# Patient Record
Sex: MALE | Employment: FULL TIME | URBAN - METROPOLITAN AREA
[De-identification: names, ages, dates, MRNs, and addresses within clinical notes are randomized per-mention and may not be internally consistent; named-entity substitution may affect disease eponyms.]

---

## 2019-10-16 PROBLEM — R53.83 FATIGUE, UNSPECIFIED TYPE: Status: ACTIVE | Noted: 2017-02-17

## 2019-10-16 PROBLEM — J06.9 VIRAL UPPER RESPIRATORY TRACT INFECTION: Status: ACTIVE | Noted: 2018-03-09

## 2019-10-16 PROBLEM — Z53.20 COLONOSCOPY REFUSED: Status: ACTIVE | Noted: 2017-05-12

## 2019-10-16 PROBLEM — K21.9 GASTROESOPHAGEAL REFLUX DISEASE WITHOUT ESOPHAGITIS: Status: ACTIVE | Noted: 2018-03-09

## 2019-10-16 PROBLEM — E78.5 HYPERLIPIDEMIA, UNSPECIFIED HYPERLIPIDEMIA TYPE: Status: ACTIVE | Noted: 2017-12-08

## 2020-06-16 PROBLEM — J06.9 VIRAL UPPER RESPIRATORY TRACT INFECTION: Status: RESOLVED | Noted: 2018-03-09 | Resolved: 2020-06-16

## 2024-05-29 RX ORDER — ASPIRIN 81 MG/1
81 TABLET ORAL DAILY
COMMUNITY

## 2024-05-29 RX ORDER — OMEPRAZOLE 20 MG/1
20 CAPSULE, DELAYED RELEASE ORAL
COMMUNITY

## 2024-05-29 RX ORDER — LOSARTAN POTASSIUM AND HYDROCHLOROTHIAZIDE 25; 100 MG/1; MG/1
1 TABLET ORAL DAILY
COMMUNITY

## 2024-05-29 RX ORDER — AMOXICILLIN 500 MG
CAPSULE ORAL DAILY
COMMUNITY

## 2024-06-06 ENCOUNTER — ANESTHESIA EVENT (OUTPATIENT)
Dept: ENDOSCOPY | Facility: HOSPITAL | Age: 69
End: 2024-06-06
Payer: COMMERCIAL

## 2024-06-07 ENCOUNTER — HOSPITAL ENCOUNTER (OUTPATIENT)
Facility: HOSPITAL | Age: 69
Setting detail: HOSPITAL OUTPATIENT SURGERY
Discharge: HOME | End: 2024-06-07
Attending: INTERNAL MEDICINE | Admitting: INTERNAL MEDICINE
Payer: COMMERCIAL

## 2024-06-07 ENCOUNTER — ANESTHESIA (OUTPATIENT)
Dept: ENDOSCOPY | Facility: HOSPITAL | Age: 69
End: 2024-06-07
Payer: COMMERCIAL

## 2024-06-07 VITALS
BODY MASS INDEX: 26.66 KG/M2 | DIASTOLIC BLOOD PRESSURE: 53 MMHG | OXYGEN SATURATION: 97 % | HEIGHT: 69 IN | SYSTOLIC BLOOD PRESSURE: 134 MMHG | WEIGHT: 180 LBS | RESPIRATION RATE: 18 BRPM | HEART RATE: 81 BPM | TEMPERATURE: 99 F

## 2024-06-07 LAB — GLUCOSE BLD-MCNC: 102 MG/DL (ref 70–99)

## 2024-06-07 PROCEDURE — 88342 IMHCHEM/IMCYTCHM 1ST ANTB: CPT | Performed by: INTERNAL MEDICINE

## 2024-06-07 PROCEDURE — 3E0H8GC INTRODUCTION OF OTHER THERAPEUTIC SUBSTANCE INTO LOWER GI, VIA NATURAL OR ARTIFICIAL OPENING ENDOSCOPIC: ICD-10-PCS | Performed by: INTERNAL MEDICINE

## 2024-06-07 PROCEDURE — 82962 GLUCOSE BLOOD TEST: CPT

## 2024-06-07 PROCEDURE — 0DB78ZX EXCISION OF STOMACH, PYLORUS, VIA NATURAL OR ARTIFICIAL OPENING ENDOSCOPIC, DIAGNOSTIC: ICD-10-PCS | Performed by: INTERNAL MEDICINE

## 2024-06-07 PROCEDURE — 0DBN8ZX EXCISION OF SIGMOID COLON, VIA NATURAL OR ARTIFICIAL OPENING ENDOSCOPIC, DIAGNOSTIC: ICD-10-PCS | Performed by: INTERNAL MEDICINE

## 2024-06-07 PROCEDURE — 88305 TISSUE EXAM BY PATHOLOGIST: CPT | Performed by: INTERNAL MEDICINE

## 2024-06-07 RX ORDER — LIDOCAINE HYDROCHLORIDE 10 MG/ML
INJECTION, SOLUTION EPIDURAL; INFILTRATION; INTRACAUDAL; PERINEURAL AS NEEDED
Status: DISCONTINUED | OUTPATIENT
Start: 2024-06-07 | End: 2024-06-07 | Stop reason: SURG

## 2024-06-07 RX ORDER — NICOTINE POLACRILEX 4 MG
15 LOZENGE BUCCAL
Status: DISCONTINUED | OUTPATIENT
Start: 2024-06-07 | End: 2024-06-07

## 2024-06-07 RX ORDER — SODIUM CHLORIDE, SODIUM LACTATE, POTASSIUM CHLORIDE, CALCIUM CHLORIDE 600; 310; 30; 20 MG/100ML; MG/100ML; MG/100ML; MG/100ML
INJECTION, SOLUTION INTRAVENOUS CONTINUOUS
Status: DISCONTINUED | OUTPATIENT
Start: 2024-06-07 | End: 2024-06-07

## 2024-06-07 RX ORDER — DEXTROSE MONOHYDRATE 25 G/50ML
50 INJECTION, SOLUTION INTRAVENOUS
Status: DISCONTINUED | OUTPATIENT
Start: 2024-06-07 | End: 2024-06-07

## 2024-06-07 RX ORDER — NICOTINE POLACRILEX 4 MG
30 LOZENGE BUCCAL
Status: DISCONTINUED | OUTPATIENT
Start: 2024-06-07 | End: 2024-06-07

## 2024-06-07 RX ADMIN — SODIUM CHLORIDE, SODIUM LACTATE, POTASSIUM CHLORIDE, CALCIUM CHLORIDE: 600; 310; 30; 20 INJECTION, SOLUTION INTRAVENOUS at 13:46:00

## 2024-06-07 RX ADMIN — LIDOCAINE HYDROCHLORIDE 25 MG: 10 INJECTION, SOLUTION EPIDURAL; INFILTRATION; INTRACAUDAL; PERINEURAL at 13:48:00

## 2024-06-07 NOTE — OPERATIVE REPORT
EGD Operative Report    Blas Griffin Patient Status:  Hospital Outpatient Surgery    1955 MRN [id number]   Formerly [facility] Attending Jesus Pollard,    Hosp Day #   0 PCP Zoraida Leal MD       Pre-op Diagnosis: ANEMIA, UNSPECIFIED TYPE, PERIUMBILICAL ABDOMINAL PAIN     Post-Op Diagnosis:    ESOPHAGUS:  Normal esophagus    STOMACH:  Moderate diffuse gastritis characterized by erythema and edema. No evidence of GI bleeding or ulcerations. No AVM's. Random gastric biopsies taken to rule out Helicobacter pylori infection    DUODENUM:  Normal duodenum     Procedure Performed: EGD     Informed Consent: Informed consent for both the procedure and sedation were obtained from the patient. The potentially life-threatening complications of sedation, bleeding,  Perforation, transfusion or repeat endoscopy were reviewed along with the possible need for hospitalization, surgical management, transfusion or repeat endoscopy should one of these complications arise. The patient understands and is agreeable to proceed.  Sedation Type: MAC-Patient received sedation with monitored anesthesia provided by an anesthesiologist  Moderate Sedation Time: None.  Deep sedation provided by anesthesia.  Procedure Description: The patient was placed in the left lateral decubitus position.  A bite block was placed in the patient’s mouth.  The endoscope was inserted through the mouth and advanced under direct visualization to the 3rd portion of the duodenum and was then withdrawn to examine the duodenal bulb and gastric antrum.  The endoscope was then retroflexed to examine the angulus, GE junction, cardia, body and fundus and then withdrawn to examine the esophagus. The endoscope was then removed from the patient. The patient tolerated the procedure  well with no immediate complications and was transferred to the recovery area in stable condition.  Findings:    ESOPHAGUS:  Normal esophagus    STOMACH:  Moderate diffuse gastritis characterized by erythema and edema. No evidence of GI bleeding or ulcerations. No AVM's. Random gastric biopsies taken to rule out Helicobacter pylori infection    DUODENUM:  Normal duodenum   Recommendations:   Await pathology results   Pantoprazole 40 mg daily for 8 weeks for gastritis   Discharge:  The patient was given an after visit summary detailing the procedure, findings, recommendations and follow up plans.  Jesus Pollard DO  6/7/2024  1:53 PM

## 2024-06-07 NOTE — DISCHARGE INSTRUCTIONS
One large polypoid, ulcerated and villous appearing completely obstructing sigmoid colon mass. This was 30 cm from anal verge. Multiple cold forceps biopsies taken. Juany ink was injected for tattooing   Colonoscope could not be passed beyond the sigmoid cancer as this was completely obstructing     Recommendations:   Await pathology results   Recommend general surgery and oncology evaluation for sigmoid colon cancer   Follows-up with Dr Madrigal in 4-6 weeks from now   Home Care Instructions for Colonoscopy and/or Gastroscopy with Sedation    Diet:  - Resume your regular diet as tolerated unless otherwise instructed.  - Start with light meals to minimize bloating.  - Do not drink alcohol today.    Medication:  - If you have questions about resuming your normal medications, please contact your Primary Care Physician.    Activities:  - Take it easy today. Do not return to work today.  - Do not drive today.  - Do not operate any machinery today (including kitchen equipment).    Colonoscopy:  - You may notice some rectal \"spotting\" (a little blood on the toilet tissue) for a day or two after the exam. This is normal.  - If you experience any rectal bleeding (not spotting), persistent tenderness or sharp severe abdominal pains, oral temperature over 100 degrees Fahrenheit, light-headedness or dizziness, or any other problems, contact your doctor.    Gastroscopy:  - You may have a sore throat for 2-3 days following the exam. This is normal. Gargling with warm salt water (1/2 tsp salt to 1 glass warm water) or using throat lozenges will help.  - If you experience any sharp pain in your neck, abdomen or chest, vomiting of blood, oral temperature over 100 degrees Fahrenheit, light-headedness or dizziness, or any other problems, contact your doctor.    **If unable to reach your doctor, please go to the [facility] Emergency Room**    - Your referring physician will receive a full report of your examination.  - If you do  not hear from your doctor's office within two weeks of your biopsy, please call them for your results.

## 2024-06-07 NOTE — ANESTHESIA POSTPROCEDURE EVALUATION
[facility]    Blas Griffin Patient Status:  Hospital Outpatient Surgery   Age/Gender 68 year old male MRN [id number]   Location [facility] ENDOSCOPY [facility] Attending Jesus Pollard DO   Hosp Day # 0 PCP Zoraida Leal MD       Anesthesia Post-op Note    ESOPHAGOGASTRODUODENOSCOPY (EGD) WITH BIOPSY, COLONOSCOPY WITH BIOPSY AND TATTOING WITH SPOT    Procedure Summary       Date: 06/07/24 Room / Location:  ENDOSCOPY 03 / [facility]    Anesthesia Start: 1346 Anesthesia Stop: 1418    Procedures:       ESOPHAGOGASTRODUODENOSCOPY (EGD) WITH BIOPSY, COLONOSCOPY WITH BIOPSY AND TATTOING WITH SPOT      COLONOSCOPY Diagnosis: (GASTRITIS, OBSTRUCTED COLON MASS)    Surgeons: Jesus Pollard DO Anesthesiologist: Kirby Bryant MD    Anesthesia Type: MAC ASA Status: 2            Anesthesia Type: MAC    Vitals Value Taken Time   /58 06/07/24 1420        Pulse 74 06/07/24 1420   Resp 16 06/07/24 1421   SpO2 90 % 06/07/24 1420   Vitals shown include unfiled device data.    Patient Location: Endoscopy    Anesthesia Type: MAC    Airway Patency: patent    Postop Pain Control: adequate    Mental Status: mildly sedated but able to meaningfully participate in the post-anesthesia evaluation    Nausea/Vomiting: none    Cardiopulmonary/Hydration status: stable euvolemic    Complications: no apparent anesthesia related complications    Postop vital signs: stable    Dental Exam: Unchanged from Preop    Patient to be discharged home when criteria met.

## 2024-06-07 NOTE — OPERATIVE REPORT
Colonoscopy Operative Report    Blas Griffin Patient Status:  Hospital Outpatient Surgery    1955 MRN [id number]   Formerly [facility] Attending Jesus Pollard,    Hosp Day #   0 PCP Zoraida Leal MD     Pre-Operative Diagnosis: ANEMIA, UNSPECIFIED TYPE, PERIUMBILICAL ABDOMINAL PAIN    Post-Operative Diagnosis:  One large polypoid, ulcerated and villous appearing completely obstructing sigmoid colon mass. This was 30 cm from anal verge. Multiple cold forceps biopsies taken. Juany ink was injected for tattooing   Colonoscope could not be passed beyond the sigmoid cancer as this was completely obstructing     Procedure Performed: COLONOSCOPY     Informed Consent: Informed consent for both the procedure and sedation were obtained from the patient. The potentially life-threatening complications of sedation, bleeding,  perforation, transfusion or repeat endoscopy  were reviewed along with the possible need for hospitalization, surgical management, transfusion or repeat endoscopy should one of these complications arise. The patient understands and is agreeable to proceed.  Sedation Type: MAC  A trained sedation nurse was present to assist in monitoring the patient during the entire length of moderate sedation time.    Cecum Withdrawal Time:  7 mins  Date of previous colonoscopy: None     Procedure Description: The patient was placed in the left lateral decubitus position.  After careful digital rectal examination, the Adult colonoscope was inserted into the rectum and advanced to the level of the cecum under direct visualization. The cecum was identified by landmarks, including the appendiceal orifice and ileoceccal valve. Careful examination of the entire colon was performed during withdrawal of the endoscope. The scope was withdrawn to the rectum and retroflexion was performed.  The patient tolerated the procedure well with  no immediate complications. The patient was transferred to the recovery area in stable condition.  Quality of Preparation: Adequate  Aronchick Bowel Prep Scale:  1- excellent   Findings:   One large polypoid, ulcerated and villous appearing completely obstructing sigmoid colon mass. This was 30 cm from anal verge. Multiple cold forceps biopsies taken. Juany ink was injected for tattooing   Colonoscope could not be passed beyond the sigmoid cancer as this was completely obstructing     Recommendations:   Await pathology results   Recommend general surgery and oncology evaluation for sigmoid colon cancer   Follows-up with Dr Madrigal in 4-6 weeks from now   Discharge:  The patient was given an after visit summary detailing the procedure, findings, recommendations and follow up plans.     Jesus Pollard DO  6/7/2024  2:12 PM

## 2024-06-07 NOTE — ANESTHESIA PREPROCEDURE EVALUATION
PRE-OP EVALUATION    Patient Name: Blas Griffin    Admit Diagnosis: ANEMIA, UNSPECIFIED TYPE, PERIUMBILICAL ABDOMINAL PAIN    Pre-op Diagnosis: ANEMIA, UNSPECIFIED TYPE, PERIUMBILICAL ABDOMINAL PAIN    ESOPHAGOGASTRODUODENOSCOPY (EGD), COLONOSCOPY    Anesthesia Procedure: ESOPHAGOGASTRODUODENOSCOPY (EGD), COLONOSCOPY  COLONOSCOPY    Surgeons and Role:     * Jesus Pollard, DO - Primary    Pre-op vitals reviewed.  Temp: 99.3 °F (37.4 °C)  Pulse: 94  Resp: 18  BP: 139/57  SpO2: 100 %  Body mass index is 26.58 kg/m².    Current medications reviewed.  Hospital Medications:   glucose (Dex4) 15 GM/59ML oral liquid 15 g  15 g Oral Q15 Min PRN    Or    glucose (Glutose) 40% oral gel 15 g  15 g Oral Q15 Min PRN    Or    glucose-vitamin C (Dex-4) chewable tab 4 tablet  4 tablet Oral Q15 Min PRN    Or    dextrose 50% injection 50 mL  50 mL Intravenous Q15 Min PRN    Or    glucose (Dex4) 15 GM/59ML oral liquid 30 g  30 g Oral Q15 Min PRN    Or    glucose (Glutose) 40% oral gel 30 g  30 g Oral Q15 Min PRN    Or    glucose-vitamin C (Dex-4) chewable tab 8 tablet  8 tablet Oral Q15 Min PRN    lactated ringers infusion   Intravenous Continuous       Outpatient Medications:     Medications Prior to Admission   Medication Sig Dispense Refill Last Dose    losartan-hydroCHLOROthiazide 100-25 MG Oral Tab Take 1 tablet by mouth daily.   Past Week    Ergocalciferol (VITAMIN D2 OR) Take by mouth. 50,000 international unit weekly   Past Week    Omega-3 Fatty Acids (FISH OIL) 1200 MG Oral Cap Take by mouth daily.   Past Week    aspirin 81 MG Oral Tab EC Take 1 tablet (81 mg total) by mouth daily.   Past Week    omeprazole 20 MG Oral Capsule Delayed Release Take 1 capsule (20 mg total) by mouth every morning before breakfast. Takes 2 capsules every morning   Past Week    SITagliptin Phosphate 50 MG Oral Tab Take 1 tablet (50 mg total) by mouth daily.   6/6/2024    SIMVASTATIN 10 MG Oral Tab TAKE 1 TABLET(10 MG) BY MOUTH EVERY NIGHT 90  tablet 1 Past Week       Allergies: Patient has no known allergies.      Anesthesia Evaluation    Patient summary reviewed.    Anesthetic Complications  (-) history of anesthetic complications         GI/Hepatic/Renal      (+) GERD                           Cardiovascular        Exercise tolerance: good     MET: >4      (+) hypertension   (+) hyperlipidemia                                  Endo/Other      (+) diabetes  type 2,                          Pulmonary    Negative pulmonary ROS.                       Neuro/Psych    Negative neuro/psych ROS.                                  History reviewed. No pertinent surgical history.  Social History     Socioeconomic History    Marital status:    Tobacco Use    Smoking status: Never    Smokeless tobacco: Never   Vaping Use    Vaping status: Never Used   Substance and Sexual Activity    Alcohol use: Yes     Comment: Social/shot of whiskey sometimes before bed    Drug use: Never    Sexual activity: Not Currently     History   Drug Use Unknown     Available pre-op labs reviewed.               Airway      Mallampati: II  Mouth opening: >3 FB  TM distance: > 6 cm  Neck ROM: full Cardiovascular    Cardiovascular exam normal.  Rhythm: regular  Rate: normal     Dental    Dentition appears grossly intact         Pulmonary    Pulmonary exam normal.  Breath sounds clear to auscultation bilaterally.               Other findings              ASA: 2   Plan: MAC  NPO status verified and patient meets guidelines.  Patient has not taken beta blockers in last 24 hours.  Post-procedure pain management plan discussed with surgeon and patient.      Plan/risks discussed with: patient, child/children and sibling                Present on Admission:  **None**

## 2024-06-24 NOTE — PAT NURSING NOTE
Spoke w pt. Stated no change to health history from 6/7/24 EKG/colonoscopy. Told pt he needs EKG pre op.  He requested we call pcp office and ask them to call him to set up appt for EKG. Spoke with Yulisa/Dr Leal/pcp office. She requests order be faxed to office and they will call pt-faxed order. Mailed pre op instructions to pt and also sent via My Chart.

## 2024-06-26 NOTE — H&P (VIEW-ONLY)
Blas Griffin is a 68 year old male.   Patient presents with:  New Patient: Referred by Dr. Pollard, for sigmoid mass,   ________________________________________________________________________  HPI: This 68-year-old male patient is here in referral from Dr. Minor for evaluation of a sigmoid colon mass, clinically consistent with colon cancer.      He has diabetes.  He was eating a lot of celery.  He had left lower quadrant discomfort, no more than 1/10.  Minimal straining with bowel movements.  Variable consistency of the bowel movements.  He underwent blood test, found to have blood loss anemia.  He has not witnessed any blood in his stool or dark stools.  He had an EGD and colonoscopy last Friday (his first scope).    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 Before meal 90 capsule 0   • polyethylene glycol, PEG 3350-KCl-NaBcb-NaCl-NaSulf, 236 g Oral Recon Soln Take as directed. 4000 mL 0   • SITagliptin Phosphate 50 MG Oral Tab Take 1 tablet (50 mg total) by mouth daily. 90 tablet 1   • simvastatin 10 MG Oral Tab Take 1 tablet (10 mg total) by mouth nightly. 90 tablet 1   • ergocalciferol 1.25 MG (14890 UT) Oral Cap Take 50,000 Units by mouth once a week.     • aspirin 81 MG Oral Tab EC Take 81 mg by mouth daily. (Patient not taking: Reported on 6/19/2024)     • losartan-hydroCHLOROthiazide 100-25 MG Oral Tab Take 1 tablet by mouth daily. 90 tablet 0     ________________________________________________________________________  ROS:  GENERAL HEALTH: Fatigue  HEENT: denies nasal congestion, sinus pain or sore throat; hearing loss negative  RESPIRATORY: denies shortness of breath,  wheezing or cough   CARDIOVASCULAR: denies chest pain or TAY; no palpitations   GI: denies nausea, denies vomiting, +/- constipation, +/- diarrhea; no rectal bleeding; no heartburn  GENITAL/: no dysuria, urgency or frequency  HEMATOLOGY: denies hx anemia; denies bruising or excessive bleeding  ________________________________________________________________________  PHYSICAL EXAM:  GENERAL: well developed, well nourished male, in no apparent distress  NECK: supple; no JVD  RESPIRATORY: lungs clear bilaterally  CARDIOVASCULAR: RRR  ABDOMEN: normal active BS+, soft, nondistended; no masses; nontender; mid epigastric hernia which is reducible, small reducible umbilical hernia  GENITAL/: no inguinal hernias  RECTAL: not examined  LYMPHATIC: no lymphadenopathy  MUSCULOSKELETAL: no upper or lower extremity problems      Labs:  Hemoglobin 7.1, hypochromic microcytic.  Alkaline phosphatase 161, other LFTs normal.  PSA 8.33.    CEA: Ordered today, pending.    EGD and colonoscopy:  EGD Operative Report    Blas Griffin Patient Status: Hospital Outpatient Surgery   1955 MRN [id number]  Location [facility] Attending Jesus Pollard DO  Hosp Day #  0 PCP Zoraida Leal MD    Pre-op Diagnosis: ANEMIA, UNSPECIFIED TYPE, PERIUMBILICAL ABDOMINAL PAIN    Post-Op Diagnosis:  ESOPHAGUS: Normal esophagus  STOMACH: Moderate diffuse gastritis characterized by erythema and edema. No evidence of GI bleeding or ulcerations. No AVM's. Random gastric biopsies taken to rule out Helicobacter pylori infection  DUODENUM: Normal duodenum    Procedure Performed: EGD    Informed Consent: Informed consent for both the procedure and sedation were obtained from the patient. The potentially life-threatening complications of sedation, bleeding, Perforation, transfusion or repeat endoscopy were reviewed along with the possible need for hospitalization, surgical management, transfusion or repeat endoscopy  should one of these complications arise. The patient understands and is agreeable to proceed.  Sedation Type: MAC-Patient received sedation with monitored anesthesia provided by an anesthesiologist  Moderate Sedation Time: None. Deep sedation provided by anesthesia.  Procedure Description: The patient was placed in the left lateral decubitus position. A bite block was placed in the patient’s mouth. The endoscope was inserted through the mouth and advanced under direct visualization to the 3rd portion of the duodenum and was then withdrawn to examine the duodenal bulb and gastric antrum. The endoscope was then retroflexed to examine the angulus, GE junction, cardia, body and fundus and then withdrawn to examine the esophagus. The endoscope was then removed from the patient. The patient tolerated the procedure well with no immediate complications and was transferred to the recovery area in stable condition.  Findings:  ESOPHAGUS: Normal esophagus  STOMACH: Moderate diffuse gastritis characterized by erythema and edema. No evidence of GI bleeding or ulcerations. No AVM's. Random gastric biopsies taken to rule out Helicobacter pylori infection  DUODENUM: Normal duodenum  Recommendations:  Await pathology results  Pantoprazole 40 mg daily for 8 weeks for gastritis  Discharge: The patient was given an after visit summary detailing the procedure, findings, recommendations and follow up plans.  Jesus Pollard DO  2024  1:53 PM      Electronically signed by Jesus Pollard DO at 2024 1:55 PM CDT   Back to top of OR Notes  Operative Report - Jesus Pollard DO - 2024 1:50 PM CDT  Formatting of this note is different from the original.  Colonoscopy Operative Report    Blas Griffin Patient Status: Hospital Outpatient Surgery   1955 MRN [id number]  [facility] Attending Jesus Pollard DO  Hosp Day #  0 PCP Zoraida Leal MD    Pre-Operative Diagnosis:  ANEMIA, UNSPECIFIED TYPE, PERIUMBILICAL ABDOMINAL PAIN    Post-Operative Diagnosis:  One large polypoid, ulcerated and villous appearing completely obstructing sigmoid colon mass. This was 30 cm from anal verge. Multiple cold forceps biopsies taken. Juany ink was injected for tattooing  Colonoscope could not be passed beyond the sigmoid cancer as this was completely obstructing    Procedure Performed: COLONOSCOPY    Informed Consent: Informed consent for both the procedure and sedation were obtained from the patient. The potentially life-threatening complications of sedation, bleeding, perforation, transfusion or repeat endoscopy were reviewed along with the possible need for hospitalization, surgical management, transfusion or repeat endoscopy should one of these complications arise. The patient understands and is agreeable to proceed.  Sedation Type: MAC  A trained sedation nurse was present to assist in monitoring the patient during the entire length of moderate sedation time.    Cecum Withdrawal Time: 7 mins  Date of previous colonoscopy: None    Procedure Description: The patient was placed in the left lateral decubitus position. After careful digital rectal examination, the Adult colonoscope was inserted into the rectum and advanced to the level of the cecum under direct visualization. The cecum was identified by landmarks, including the appendiceal orifice and ileoceccal valve. Careful examination of the entire colon was performed during withdrawal of the endoscope. The scope was withdrawn to the rectum and retroflexion was performed. The patient tolerated the procedure well with no immediate complications. The patient was transferred to the recovery area in stable condition.  Quality of Preparation: Adequate  Aronchick Bowel Prep Scale:  1- excellent  Findings:  One large polypoid, ulcerated and villous appearing completely obstructing sigmoid colon mass. This was 30 cm from anal verge. Multiple cold forceps  biopsies taken. Juany ink was injected for tattooing  Colonoscope could not be passed beyond the sigmoid cancer as this was completely obstructing    Recommendations:  Await pathology results  Recommend general surgery and oncology evaluation for sigmoid colon cancer  Follows-up with Dr Madrigal in 4-6 weeks from now  Discharge: The patient was given an after visit summary detailing the procedure, findings, recommendations and follow up plans.    Jesus Pollard DO  6/7/2024  2:12 PM    Pathology:  Final Diagnosis:    A. Gastric biopsy:  -Gastric antral mucosa with reactive gastropathy.  -Gastric fundic mucosa with no significant pathologic change.  -No Helicobacter pylori organisms seen.      B. Sigmoid colon mass biopsy:  -At least tubulovillous adenoma with high grade dysplasia.  See comment.      Electronically signed by Blaze Contreras MD on 6/10/2024 at  2:52 PM   Embedded Images    Final Diagnosis Comment    Histologic sections of the sigmoid colon mass biopsy demonstrate fragments of tubulovillous adenoma with at least high grade dysplasia.  Multiple levels are reviewed and a  definitive diagnosis of invasive carcinoma cannot be established on the submitted material.  However, since this is a superficial biopsy of a larger mass lesion, a more severe, unsampled process cannot be excluded.  Clinical and endoscopic correlation is suggested.  Dr. KHOA Pal has reviewed the case and concurs.       CT scan:  DATE OF SERVICE: 05.25.2024  PROCEDURE:  CT ABDOMEN+PELVIS(CONTRAST ONLY)(CPT=74177)    CLINICAL INDICATION:  Anemia. Periumbilical pain.    COMPARISON: None.    TECHNIQUE:  CT of the abdomen and pelvis was obtained with intravenous contrast.  80 mL of Isovue  370 was administered intravenously.    Automated exposure control and ALARA manual techniques for patient specific dose reduction were  followed while maintaining the necessary diagnostic image quality.    ADVERSE REACTION: None.    FINDINGS:    LOWER THORAX:  The heart is normal in size. No focal consolidation is seen.  LIVER: There are innumerable (on the order of 30-50) bilobar hypodense ill-defined liver lesions.  The largest on the right measures 3.2 cm within hepatic segment VIII (series 4, image 30). The  largest on the left measures 3.4 cm in hepatic segment Soraida (series 4, image 27).  BILIARY TREE: The gallbladder is present. There is no biliary ductal dilatation.  PANCREAS: The pancreas is normal in size.  SPLEEN: The spleen is normal in size.  ADRENALS: The adrenal glands are normal in size and shape.  KIDNEYS: There is no hydronephrosis.  LYMPH NODES: There is no adenopathy.  VASCULATURE: Mild atherosclerotic calcification of a normal caliber abdominal aorta is present.  PERITONEUM/MESENTRY/OMENTUM: No intra-abdominal free air or free fluid is seen.  GI TRACT: There is no bowel obstruction. There is moderate to marked thickening involving 5.7 cm of  sigmoid colon with mild pericolonic stranding (series 4, image 116). Adjacent to the sigmoid colon  are a few soft tissue nodules measuring up to 1.4 cm (series 4, image 108). The appendix is  unremarkable.  PELVIC UROGENITAL STRUCTURES: The bladder is unremarkable. The prostate is present.  BODY WALL: A 1.5 cm lucent lesion is noted within the left iliac wing (series 4, image 131). There  is moderate sacroiliac osteoarthritis. Moderate degenerative changes are seen throughout the lumbar  spine. A 2.2 x 1.8 cm fat-containing umbilical hernia is present.  A 2.2 x 3.4 cm supraumbilical  fat-containing hernia is noted (series 4, image 85).    Impression   IMPRESSION:    1.  Segmental thickening of the sigmoid colon with pericolonic soft tissue nodules is worrisome for  a primary colonic neoplasm. Further investigation with colonoscopy would be advised if not  previously performed.  2.  Innumerable hypodense hepatic lesions, suspicious for metastatic disease.  3.  A 1.5 cm lucent lesion in the left iliac wing may  represent focal fatty marrow; however,  attention on follow-up imaging would be advised.    This examination was marked for follow-up.       CT images reviewed.  The liver lesions were discussed with them in detail.    ________________________________________________________________________  ASSESSMENT/ PLAN:  Sigmoid colon mass clinically a nearly obstructing sigmoid cancer, liver lesions concerning for metastatic disease, blood loss anemia    Recommendation:  He met with Dr. Mandujano today.  She and I discussed this case prior to our visit.    He has what appears to be stage IV colon cancer, with the extent of metastatic disease rendering it incurable.  Although the sigmoid biopsies were not conclusively confirmatory, it is consistent with colon cancer.  I discussed the details of this in detail with him and his family.  All of the other indicators suggest colon cancer, including the obstructing mass seen on colonoscopy, the CT findings, and the liver lesions.  We discussed the innumerable liver lesions, and the blood supply of the GI tract.  We discussed that if this represents stage IV disease, he is not curable.  An interventional radiology liver biopsy is planned.  Communicated with IR.  If this confirms metastatic colon cancer, no additional biopsy would need to be obtained from the sigmoid mass.  He has a pending CEA as well.  CT of the chest is ordered.    The role of palliative resection was discussed with Dr. Mandujano and with them.  He does have blood loss anemia, though he never saw any yaneli blood.  This is likely a slow oozing of blood, and may be manageable with iron replacement.  We discussed his left lower quadrant low-level symptoms, which are intermittent.  There is no sign of obstruction on the CT scan, and he seems to be having regular bowel movements.  On the other hand, the colonoscopy demonstrated sufficient obstruction that the scope could not traverse the tumor.  The lack of visualization of the  proximal colon is moot, given the overall situation.  We discussed that surgical resection would not add to long-term survival.  Furthermore, surgical recovery would likely delay him receiving other crucial palliative treatments.  In the balance, we are opting for the liver biopsy and proceeding with chemotherapy.  The role of palliative resection will be deferred, and hopefully not required.  We also discussed the role of palliative stent, which would likely be a bridge to palliative resection, if needed.    We discussed the role of genetic testing.  I discussed this with Dr. Mandujano, and awaiting for test from the current pathology or needle biopsy.    Together, we reviewed my [place] literature on colon cancer and colon surgery.  He brought pictures from the colonoscopy with him, which were helpful to review.    We plan to obtain liver biopsy, follow-up on CEA, complete the staging workup.  He will be placed on iron to replace his blood losses, including iron infusion.  He will likely require port insertion, and we discussed this.    Thank you for involving me in the care of your patient.  ________________________________________________________________________

## 2024-06-28 ENCOUNTER — ANESTHESIA EVENT (OUTPATIENT)
Dept: SURGERY | Facility: HOSPITAL | Age: 69
End: 2024-06-28
Payer: COMMERCIAL

## 2024-07-02 ENCOUNTER — APPOINTMENT (OUTPATIENT)
Dept: GENERAL RADIOLOGY | Facility: HOSPITAL | Age: 69
End: 2024-07-02
Attending: SURGERY
Payer: COMMERCIAL

## 2024-07-02 ENCOUNTER — HOSPITAL ENCOUNTER (OUTPATIENT)
Facility: HOSPITAL | Age: 69
Setting detail: HOSPITAL OUTPATIENT SURGERY
Discharge: HOME | End: 2024-07-02
Attending: SURGERY | Admitting: SURGERY
Payer: COMMERCIAL

## 2024-07-02 ENCOUNTER — ANESTHESIA (OUTPATIENT)
Dept: SURGERY | Facility: HOSPITAL | Age: 69
End: 2024-07-02
Payer: COMMERCIAL

## 2024-07-02 VITALS
BODY MASS INDEX: 24.94 KG/M2 | TEMPERATURE: 99 F | RESPIRATION RATE: 16 BRPM | HEART RATE: 90 BPM | SYSTOLIC BLOOD PRESSURE: 117 MMHG | DIASTOLIC BLOOD PRESSURE: 50 MMHG | OXYGEN SATURATION: 99 % | HEIGHT: 69 IN | WEIGHT: 168.38 LBS

## 2024-07-02 DIAGNOSIS — C18.7 MALIGNANT NEOPLASM OF SIGMOID COLON (HCC): Primary | ICD-10-CM

## 2024-07-02 LAB — GLUCOSE BLD-MCNC: 124 MG/DL (ref 70–99)

## 2024-07-02 PROCEDURE — 77001 FLUOROGUIDE FOR VEIN DEVICE: CPT | Performed by: SURGERY

## 2024-07-02 PROCEDURE — 02HV33Z INSERTION OF INFUSION DEVICE INTO SUPERIOR VENA CAVA, PERCUTANEOUS APPROACH: ICD-10-PCS | Performed by: SURGERY

## 2024-07-02 PROCEDURE — 82962 GLUCOSE BLOOD TEST: CPT

## 2024-07-02 PROCEDURE — 0JH60WZ INSERTION OF TOTALLY IMPLANTABLE VASCULAR ACCESS DEVICE INTO CHEST SUBCUTANEOUS TISSUE AND FASCIA, OPEN APPROACH: ICD-10-PCS | Performed by: SURGERY

## 2024-07-02 PROCEDURE — 76000 FLUOROSCOPY <1 HR PHYS/QHP: CPT | Performed by: SURGERY

## 2024-07-02 DEVICE — POWERPORT CLEARVUE SLIM WITH SMOOTH SEPTUM, 8F POLYURETHANE CATHETER, SUTURE PLUGS, INTERMEDIATE KIT
Type: IMPLANTABLE DEVICE | Site: CHEST | Status: FUNCTIONAL
Brand: POWERPORT CLEARVUE

## 2024-07-02 RX ORDER — NICOTINE POLACRILEX 4 MG
30 LOZENGE BUCCAL
Status: DISCONTINUED | OUTPATIENT
Start: 2024-07-02 | End: 2024-07-02 | Stop reason: HOSPADM

## 2024-07-02 RX ORDER — LABETALOL HYDROCHLORIDE 5 MG/ML
5 INJECTION, SOLUTION INTRAVENOUS EVERY 5 MIN PRN
Status: DISCONTINUED | OUTPATIENT
Start: 2024-07-02 | End: 2024-07-02

## 2024-07-02 RX ORDER — ONDANSETRON 2 MG/ML
4 INJECTION INTRAMUSCULAR; INTRAVENOUS EVERY 6 HOURS PRN
Status: DISCONTINUED | OUTPATIENT
Start: 2024-07-02 | End: 2024-07-02

## 2024-07-02 RX ORDER — NALOXONE HYDROCHLORIDE 0.4 MG/ML
80 INJECTION, SOLUTION INTRAMUSCULAR; INTRAVENOUS; SUBCUTANEOUS AS NEEDED
Status: DISCONTINUED | OUTPATIENT
Start: 2024-07-02 | End: 2024-07-02

## 2024-07-02 RX ORDER — SODIUM CHLORIDE, SODIUM LACTATE, POTASSIUM CHLORIDE, CALCIUM CHLORIDE 600; 310; 30; 20 MG/100ML; MG/100ML; MG/100ML; MG/100ML
INJECTION, SOLUTION INTRAVENOUS CONTINUOUS
Status: DISCONTINUED | OUTPATIENT
Start: 2024-07-02 | End: 2024-07-02

## 2024-07-02 RX ORDER — BUPIVACAINE HYDROCHLORIDE 2.5 MG/ML
INJECTION, SOLUTION EPIDURAL; INFILTRATION; INTRACAUDAL AS NEEDED
Status: DISCONTINUED | OUTPATIENT
Start: 2024-07-02 | End: 2024-07-02

## 2024-07-02 RX ORDER — MEPERIDINE HYDROCHLORIDE 25 MG/ML
12.5 INJECTION INTRAMUSCULAR; INTRAVENOUS; SUBCUTANEOUS AS NEEDED
Status: DISCONTINUED | OUTPATIENT
Start: 2024-07-02 | End: 2024-07-02

## 2024-07-02 RX ORDER — DEXTROSE MONOHYDRATE 25 G/50ML
50 INJECTION, SOLUTION INTRAVENOUS
Status: DISCONTINUED | OUTPATIENT
Start: 2024-07-02 | End: 2024-07-02 | Stop reason: HOSPADM

## 2024-07-02 RX ORDER — HYDROCODONE BITARTRATE AND ACETAMINOPHEN 10; 325 MG/1; MG/1
1 TABLET ORAL ONCE AS NEEDED
Status: DISCONTINUED | OUTPATIENT
Start: 2024-07-02 | End: 2024-07-02

## 2024-07-02 RX ORDER — ACETAMINOPHEN 500 MG
1000 TABLET ORAL ONCE
Status: DISCONTINUED | OUTPATIENT
Start: 2024-07-02 | End: 2024-07-02 | Stop reason: HOSPADM

## 2024-07-02 RX ORDER — TRAMADOL HYDROCHLORIDE 50 MG/1
TABLET ORAL EVERY 6 HOURS PRN
Qty: 10 TABLET | Refills: 0 | Status: SHIPPED | OUTPATIENT
Start: 2024-07-02

## 2024-07-02 RX ORDER — HYDROCODONE BITARTRATE AND ACETAMINOPHEN 10; 325 MG/1; MG/1
2 TABLET ORAL ONCE AS NEEDED
Status: DISCONTINUED | OUTPATIENT
Start: 2024-07-02 | End: 2024-07-02

## 2024-07-02 RX ORDER — MIDAZOLAM HYDROCHLORIDE 1 MG/ML
INJECTION INTRAMUSCULAR; INTRAVENOUS AS NEEDED
Status: DISCONTINUED | OUTPATIENT
Start: 2024-07-02 | End: 2024-07-02 | Stop reason: SURG

## 2024-07-02 RX ORDER — ONDANSETRON 2 MG/ML
INJECTION INTRAMUSCULAR; INTRAVENOUS AS NEEDED
Status: DISCONTINUED | OUTPATIENT
Start: 2024-07-02 | End: 2024-07-02 | Stop reason: SURG

## 2024-07-02 RX ORDER — HYDROMORPHONE HYDROCHLORIDE 1 MG/ML
0.2 INJECTION, SOLUTION INTRAMUSCULAR; INTRAVENOUS; SUBCUTANEOUS EVERY 5 MIN PRN
Status: DISCONTINUED | OUTPATIENT
Start: 2024-07-02 | End: 2024-07-02

## 2024-07-02 RX ORDER — DEXAMETHASONE SODIUM PHOSPHATE 4 MG/ML
VIAL (ML) INJECTION AS NEEDED
Status: DISCONTINUED | OUTPATIENT
Start: 2024-07-02 | End: 2024-07-02 | Stop reason: SURG

## 2024-07-02 RX ORDER — MIDAZOLAM HYDROCHLORIDE 1 MG/ML
1 INJECTION INTRAMUSCULAR; INTRAVENOUS EVERY 5 MIN PRN
Status: DISCONTINUED | OUTPATIENT
Start: 2024-07-02 | End: 2024-07-02

## 2024-07-02 RX ORDER — NICOTINE POLACRILEX 4 MG
15 LOZENGE BUCCAL
Status: DISCONTINUED | OUTPATIENT
Start: 2024-07-02 | End: 2024-07-02 | Stop reason: HOSPADM

## 2024-07-02 RX ORDER — HYDROMORPHONE HYDROCHLORIDE 1 MG/ML
0.6 INJECTION, SOLUTION INTRAMUSCULAR; INTRAVENOUS; SUBCUTANEOUS EVERY 5 MIN PRN
Status: DISCONTINUED | OUTPATIENT
Start: 2024-07-02 | End: 2024-07-02

## 2024-07-02 RX ORDER — METOCLOPRAMIDE HYDROCHLORIDE 5 MG/ML
10 INJECTION INTRAMUSCULAR; INTRAVENOUS EVERY 8 HOURS PRN
Status: DISCONTINUED | OUTPATIENT
Start: 2024-07-02 | End: 2024-07-02

## 2024-07-02 RX ORDER — ACETAMINOPHEN 500 MG
1000 TABLET ORAL ONCE AS NEEDED
Status: DISCONTINUED | OUTPATIENT
Start: 2024-07-02 | End: 2024-07-02

## 2024-07-02 RX ORDER — HYDROMORPHONE HYDROCHLORIDE 1 MG/ML
0.4 INJECTION, SOLUTION INTRAMUSCULAR; INTRAVENOUS; SUBCUTANEOUS EVERY 5 MIN PRN
Status: DISCONTINUED | OUTPATIENT
Start: 2024-07-02 | End: 2024-07-02

## 2024-07-02 RX ADMIN — MIDAZOLAM HYDROCHLORIDE 2 MG: 1 INJECTION INTRAMUSCULAR; INTRAVENOUS at 11:20:00

## 2024-07-02 RX ADMIN — MIDAZOLAM HYDROCHLORIDE 2 MG: 1 INJECTION INTRAMUSCULAR; INTRAVENOUS at 11:15:00

## 2024-07-02 RX ADMIN — ONDANSETRON 4 MG: 2 INJECTION INTRAMUSCULAR; INTRAVENOUS at 11:16:00

## 2024-07-02 RX ADMIN — DEXAMETHASONE SODIUM PHOSPHATE 4 MG: 4 MG/ML VIAL (ML) INJECTION at 11:16:00

## 2024-07-02 RX ADMIN — SODIUM CHLORIDE, SODIUM LACTATE, POTASSIUM CHLORIDE, CALCIUM CHLORIDE: 600; 310; 30; 20 INJECTION, SOLUTION INTRAVENOUS at 12:13:00

## 2024-07-02 NOTE — OPERATIVE REPORT
[facility] OPERATIVE REPORT     Patient Name:  Blas Griffin  MRN: [id number]    Date of Operation:  2024  : 1955       PREOPERATIVE DIAGNOSIS: Metastatic sigmoid colon cancer (stage IV)     POSTOPERATIVE DIAGNOSIS: Same     PROCEDURE PERFORMED: Insertion of Bard PowerPort with fluoroscopy (tunneled).      SURGEON: William Blandon MD      ASSISTANT: GARO Lowe (services required for positioning, prepping, draping, setting up the field, exposing, retracting, suturing, closing, cutting, dressing, etc.)     ANESTHESIA: MAC.      COMPLICATIONS: None.      ESTIMATED BLOOD LOSS: 2 mL.      SPECIMEN: None.      IMPLANT: Bard ClearVue Slim Implantable PowerPort.      DRAINS: None.      BLOOD PRODUCTS ADMINISTERED: None.      COMPLICATIONS: None.      HISTORY: This patient is a 69-year-old male who was recently diagnosed with a sigmoid colon cancer metastatic to the liver.  He was seen in surgical and medical oncological consultations.  Chemotherapy is recommended.  Port insertion is requested to facilitate administration.  The risks, benefits, and alternatives of port insertion were discussed with him and his family, including bleeding, infection, deep venous thrombosis, pneumothorax, malposition, malfunction, etc. The left side was selected and marked.      PROCEDURE: The patient was seen in the preoperative holding area with his brother and daughter. The surgical plan was reviewed. The left side was initialed.  He was taken to the operating room, placed in the supine position, and sedated. The upper extremities were tucked at the sides. The chest and neck area were prepped and draped in the standard fashion. A timeout was performed. Local anesthetic was infiltrated. An incision was made over the left deltopectoral groove and carried to the cephalic vein. Proximal and distal control was obtained. A venotomy was made, and the Bard ClearVue PowerPort catheter was introduced into the vein and  advanced under fluoroscopic guidance to the cavoatrial junction. In this position, it freely aspirated blood and was heparinized. The catheter was cut to size and secured to the port with the attachment device. The port was anchored in a subcutaneous pocket with Prolene stitches. The wound was irrigated and rendered hemostatic. It was closed in 2 layers with Vicryl. The port was accessed through the skin with a Muñoz needle. It freely aspirated blood and was heparinized. The port was de-accessed. A Dermabond dressing was applied. The needle, sponge, and instrument counts were reported as correct. The patient was awakened and transferred to recovery.  His family was notified of the findings and his status.         William Giang MD

## 2024-07-02 NOTE — ANESTHESIA POSTPROCEDURE EVALUATION
Problem: Discharge Planning  Goal: Discharge to home or other facility with appropriate resources  1/18/2024 1710 by Gem Joyner RN  Outcome: Progressing  1/18/2024 0436 by Nannette Cordero RN  Outcome: Progressing     Problem: Pain  Goal: Verbalizes/displays adequate comfort level or baseline comfort level  1/18/2024 1710 by Gem Joyner RN  Outcome: Progressing  1/18/2024 0436 by Nannette Cordero RN  Outcome: Progressing     Problem: Gastrointestinal - Adult  Goal: Minimal or absence of nausea and vomiting  1/18/2024 1710 by Gem Joyner RN  Outcome: Progressing  1/18/2024 0436 by Nannette Cordero RN  Outcome: Progressing  Goal: Maintains or returns to baseline bowel function  1/18/2024 0436 by Nannette Cordero RN  Outcome: Progressing  Goal: Maintains adequate nutritional intake  1/18/2024 1710 by Gem Joyner RN  Outcome: Progressing  1/18/2024 0436 by Nannette Cordero RN  Outcome: Progressing      [facility]    Blas Griffin Patient Status:  Hospital Outpatient Surgery   Age/Gender 69 year old male MRN [id number]   Location [facility] SURGERY Attending William Weaver MD   Hosp Day # 0 PCP Zoraida Leal MD       Anesthesia Post-op Note    PORT-A-CATHETER PLACEMENT WITH FLUOROSCOPY    Procedure Summary       Date: 07/02/24 Room / Location:  MAIN OR 05 /  MAIN OR    Anesthesia Start: 1114 Anesthesia Stop: 1213    Procedure: PORT-A-CATHETER PLACEMENT WITH FLUOROSCOPY Diagnosis: (CANCER OF SIGMOID COLON)    Surgeons: William Weaver MD Anesthesiologist: Dariusz Samano MD    Anesthesia Type: MAC ASA Status: 3            Anesthesia Type: MAC    Vitals Value Taken Time   BP 95/55 07/02/24 1213   Temp 98.5 °F (36.9 °C) 07/02/24 1213   Pulse 93 07/02/24 1213   Resp 16 07/02/24 1213   SpO2 93 % 07/02/24 1213       Patient Location: Same Day Surgery    Anesthesia Type: MAC    Airway Patency: patent    Postop Pain Control: adequate    Mental Status: preanesthetic baseline    Nausea/Vomiting: none    Cardiopulmonary/Hydration status: stable euvolemic    Complications: no apparent anesthesia related complications    Postop vital signs: stable    Dental Exam: Unchanged from Preop    Patient to be discharged home when criteria met.

## 2024-07-02 NOTE — INTERVAL H&P NOTE
Pre-op Diagnosis: CANCER OF SIGMOID COLON    The above referenced H&P was reviewed by William Weaver MD on 7/2/2024, the patient was examined and no significant changes have occurred in the patient's condition since the H&P was performed.  I discussed with the patient and/or legal representative the potential benefits, risks and side effects of this procedure; the likelihood of the patient achieving goals; and potential problems that might occur during recuperation.  I discussed reasonable alternatives to the procedure, including risks, benefits and side effects related to the alternatives and risks related to not receiving this procedure.  We will proceed with procedure as planned.    He had an image guided liver biopsy, confirming metastatic colon cancer.  He followed up with Dr. Mandujano from medical oncology.  They are planning to initiate chemotherapy.  Port insertion is requested.  The left side was marked.  The surgical procedure, risks, benefits, alternatives, recovery, restrictions, etc. were discussed.

## 2024-07-02 NOTE — DISCHARGE INSTRUCTIONS
No need for postoperative chest x-ray.  Okay to shower on 7/3  The skin glue until it flakes off  Ice to the incision  Over-the-counter pain medications are okay  Tramadol for breakthrough pain  No heavy lifting or upper body exercise for 1 week  Follow-up with me only as needed, as he will have close follow-up with the infusion center

## 2024-07-02 NOTE — ANESTHESIA PREPROCEDURE EVALUATION
PRE-OP EVALUATION    Patient Name: Blas Griffin    Admit Diagnosis: CANCER OF SIGMOID COLON    Pre-op Diagnosis: CANCER OF SIGMOID COLON    PORT-A-CATHETER PLACEMENT WITH FLUOROSCOPY    Anesthesia Procedure: PORT-A-CATHETER PLACEMENT WITH FLUOROSCOPY    Surgeons and Role:     * William Weaver MD - Primary    Pre-op vitals reviewed.        Body mass index is 26.58 kg/m².    Current medications reviewed.  Hospital Medications:  No current facility-administered medications on file as of .       Outpatient Medications:     No medications prior to admission.       Allergies: Patient has no known allergies.      Anesthesia Evaluation    Patient summary reviewed.    Anesthetic Complications  (-) history of anesthetic complications         GI/Hepatic/Renal      (+) GERD                           Cardiovascular                  (+) hypertension   (+) hyperlipidemia                                  Endo/Other      (+) diabetes         (+) anemia                   Pulmonary                           Neuro/Psych                              Colonoscopy refused Dysthymic disorder  Essential (primary) hypertension Fatigue, unspecified type  Gastroesophageal reflux disease without esophagitis Hyperlipidemia, unspecified hyperlipidemia type  Other specified counseling Over weight  Pure hypercholesterolemia             Past Surgical History:   Procedure Laterality Date    Colonoscopy N/A 6/7/2024    Procedure: COLONOSCOPY;  Surgeon: Jesus Pollard DO;  Location:  ENDOSCOPY     Social History     Socioeconomic History    Marital status:    Tobacco Use    Smoking status: Never    Smokeless tobacco: Never   Vaping Use    Vaping status: Never Used   Substance and Sexual Activity    Alcohol use: Yes     Comment: Social/shot of whiskey sometimes before bed    Drug use: Never    Sexual activity: Not Currently     History   Drug Use Unknown     Available pre-op labs reviewed.               Airway      Mallampati: I  Mouth  opening: >3 FB  TM distance: > 6 cm  Neck ROM: full Cardiovascular    Cardiovascular exam normal.         Dental    Dentition appears grossly intact         Pulmonary    Pulmonary exam normal.                 Other findings              ASA: 3   Plan: MAC  NPO status verified and           Plan/risks discussed with: patient                Present on Admission:  **None**

## 2025-06-30 RX ORDER — FERROUS SULFATE 324(65)MG
1 TABLET, DELAYED RELEASE (ENTERIC COATED) ORAL DAILY
COMMUNITY

## 2025-07-08 ENCOUNTER — HOSPITAL ENCOUNTER (OUTPATIENT)
Facility: HOSPITAL | Age: 70
Setting detail: HOSPITAL OUTPATIENT SURGERY
Discharge: HOME | End: 2025-07-08
Attending: INTERNAL MEDICINE | Admitting: INTERNAL MEDICINE
Payer: COMMERCIAL

## 2025-07-08 ENCOUNTER — ANESTHESIA EVENT (OUTPATIENT)
Dept: ENDOSCOPY | Facility: HOSPITAL | Age: 70
End: 2025-07-08
Payer: COMMERCIAL

## 2025-07-08 ENCOUNTER — ANESTHESIA (OUTPATIENT)
Dept: ENDOSCOPY | Facility: HOSPITAL | Age: 70
End: 2025-07-08
Payer: COMMERCIAL

## 2025-07-08 VITALS
OXYGEN SATURATION: 98 % | BODY MASS INDEX: 23.62 KG/M2 | WEIGHT: 165 LBS | DIASTOLIC BLOOD PRESSURE: 69 MMHG | TEMPERATURE: 98 F | HEIGHT: 70 IN | HEART RATE: 72 BPM | SYSTOLIC BLOOD PRESSURE: 125 MMHG | RESPIRATION RATE: 16 BRPM

## 2025-07-08 PROCEDURE — 88305 TISSUE EXAM BY PATHOLOGIST: CPT | Performed by: INTERNAL MEDICINE

## 2025-07-08 RX ORDER — HYDROMORPHONE HYDROCHLORIDE 1 MG/ML
0.4 INJECTION, SOLUTION INTRAMUSCULAR; INTRAVENOUS; SUBCUTANEOUS EVERY 5 MIN PRN
Status: DISCONTINUED | OUTPATIENT
Start: 2025-07-08 | End: 2025-07-08

## 2025-07-08 RX ORDER — LABETALOL HYDROCHLORIDE 5 MG/ML
5 INJECTION, SOLUTION INTRAVENOUS EVERY 5 MIN PRN
Status: DISCONTINUED | OUTPATIENT
Start: 2025-07-08 | End: 2025-07-08

## 2025-07-08 RX ORDER — NICOTINE POLACRILEX 4 MG
30 LOZENGE BUCCAL
Status: DISCONTINUED | OUTPATIENT
Start: 2025-07-08 | End: 2025-07-08

## 2025-07-08 RX ORDER — HYDROCODONE BITARTRATE AND ACETAMINOPHEN 5; 325 MG/1; MG/1
1 TABLET ORAL ONCE AS NEEDED
Status: DISCONTINUED | OUTPATIENT
Start: 2025-07-08 | End: 2025-07-08

## 2025-07-08 RX ORDER — NICOTINE POLACRILEX 4 MG
15 LOZENGE BUCCAL
Status: DISCONTINUED | OUTPATIENT
Start: 2025-07-08 | End: 2025-07-08

## 2025-07-08 RX ORDER — HYDROCODONE BITARTRATE AND ACETAMINOPHEN 5; 325 MG/1; MG/1
2 TABLET ORAL ONCE AS NEEDED
Status: DISCONTINUED | OUTPATIENT
Start: 2025-07-08 | End: 2025-07-08

## 2025-07-08 RX ORDER — NALOXONE HYDROCHLORIDE 0.4 MG/ML
0.08 INJECTION, SOLUTION INTRAMUSCULAR; INTRAVENOUS; SUBCUTANEOUS AS NEEDED
Status: DISCONTINUED | OUTPATIENT
Start: 2025-07-08 | End: 2025-07-08

## 2025-07-08 RX ORDER — SODIUM CHLORIDE, SODIUM LACTATE, POTASSIUM CHLORIDE, CALCIUM CHLORIDE 600; 310; 30; 20 MG/100ML; MG/100ML; MG/100ML; MG/100ML
INJECTION, SOLUTION INTRAVENOUS CONTINUOUS
Status: DISCONTINUED | OUTPATIENT
Start: 2025-07-08 | End: 2025-07-08

## 2025-07-08 RX ORDER — HYDROMORPHONE HYDROCHLORIDE 1 MG/ML
0.2 INJECTION, SOLUTION INTRAMUSCULAR; INTRAVENOUS; SUBCUTANEOUS EVERY 5 MIN PRN
Status: DISCONTINUED | OUTPATIENT
Start: 2025-07-08 | End: 2025-07-08

## 2025-07-08 RX ORDER — DEXTROSE MONOHYDRATE 25 G/50ML
50 INJECTION, SOLUTION INTRAVENOUS
Status: DISCONTINUED | OUTPATIENT
Start: 2025-07-08 | End: 2025-07-08

## 2025-07-08 RX ORDER — HYDROMORPHONE HYDROCHLORIDE 1 MG/ML
0.6 INJECTION, SOLUTION INTRAMUSCULAR; INTRAVENOUS; SUBCUTANEOUS EVERY 5 MIN PRN
Status: DISCONTINUED | OUTPATIENT
Start: 2025-07-08 | End: 2025-07-08

## 2025-07-08 RX ORDER — ACETAMINOPHEN 500 MG
1000 TABLET ORAL ONCE AS NEEDED
Status: DISCONTINUED | OUTPATIENT
Start: 2025-07-08 | End: 2025-07-08

## 2025-07-08 RX ADMIN — SODIUM CHLORIDE, SODIUM LACTATE, POTASSIUM CHLORIDE, CALCIUM CHLORIDE: 600; 310; 30; 20 INJECTION, SOLUTION INTRAVENOUS at 09:18:00

## 2025-07-08 NOTE — DISCHARGE INSTRUCTIONS
Once again we witnessed sigmoid colon mass (found of colonoscopy in June 2024) which has significantly shrunken and decreased in size but the mass is still completely obstructing and we could not traversed with colonoscope beyond this sigmoid colon  mass.  Biopsied. This mass is  present at 30 cm from anal verge   Evidence of previously injected tejas ink in sigmoid colon     Recommendations:   Follow-up with oncology and surgery         Home Care Instructions for Colonoscopy with Sedation    Diet:  - Resume your regular diet as tolerated unless otherwise instructed.  - Start with light meals to minimize bloating.  - Do not drink alcohol today.    Medication:  - If you have questions about resuming your normal medications, please contact your Primary Care Physician.    Activities:  - Take it easy today. Do not return to work today.  - Do not drive today.  - Do not operate any machinery today (including kitchen equipment).    Colonoscopy:  - You may notice some rectal \"spotting\" (a little blood on the toilet tissue) for a day or two after the exam. This is normal.  - If you experience any rectal bleeding (not spotting), persistent tenderness or sharp severe abdominal pains, oral temperature over 100 degrees Fahrenheit, light-headedness or dizziness, or any other problems, contact your doctor.    **If unable to reach your doctor, please go to the [facility] Emergency Room**    - Your referring physician will receive a full report of your examination.  - If you do not hear from your doctor's office within two weeks of your biopsy, please call them for your results.    You may be able to see your laboratory results in [employer] between 4 and 7 business days.  In some cases, your physician may not have viewed the results before they are released to [employer].  If you have questions regarding your results contact the physician who ordered the test/exam by phone or via [employer] by choosing \"Ask a Medical Question.\"

## 2025-07-08 NOTE — ANESTHESIA PREPROCEDURE EVALUATION
PRE-OP EVALUATION    Patient Name: Blas Griffin    Admit Diagnosis: MALIGNANT NEOPLASM OF COLON, UNSPECIFIED PART OF COLON    Pre-op Diagnosis: MALIGNANT NEOPLASM OF COLON, UNSPECIFIED PART OF COLON    COLONOSCOPY    Anesthesia Procedure: COLONOSCOPY    Surgeons and Role:     * Jesus Pollard, DO - Primary    Pre-op vitals reviewed.  Temp: 98.4 °F (36.9 °C)  Pulse: 70  Resp: 17  BP: 135/63  SpO2: 99 %  Body mass index is 23.68 kg/m².    Current medications reviewed.  Hospital Medications:  Current Medications[1]    Outpatient Medications:   Prescriptions Prior to Admission[2]    Allergies: Patient has no known allergies.      Anesthesia Evaluation    Patient summary reviewed.    Anesthetic Complications  (-) history of anesthetic complications         GI/Hepatic/Renal                                 Cardiovascular        Exercise tolerance: good     MET: >4      (+) hypertension                                     Endo/Other           (-) hypothyroidism  (-) hyperthyroidism                     Pulmonary    Negative pulmonary ROS.                       Neuro/Psych                                      Past Surgical History[3]  Social Hx on file[4]  History   Drug Use Unknown     Available pre-op labs reviewed.               Airway      Mallampati: III  Mouth opening: >3 FB  TM distance: > 6 cm  Neck ROM: full Cardiovascular      Rhythm: regular       Dental             Pulmonary    Pulmonary exam normal.                 Other findings              ASA: 3   Plan: general  NPO status verified and           Plan/risks discussed with: patient          Monitored anesthesia care explained, pt is aware that they may remember proceedings from the procedure and that this is normal. Discussed potential for conversion to general anesthesia if unable to tolerate procedure in MAC   Plan/risks discussed with: patient         Present on Admission:  **None**             [1]    lactated ringers infusion   Intravenous Continuous    [2]   Medications Prior to Admission   Medication Sig Dispense Refill Last Dose/Taking    Ferrous Sulfate 324 (65 Fe) MG Oral Tab EC Take 1 tablet by mouth daily.   7/7/2025    losartan-hydroCHLOROthiazide 100-25 MG Oral Tab Take 1 tablet by mouth in the morning.   7/8/2025 Morning    Ergocalciferol (VITAMIN D2 OR) Take by mouth. 50,000 international unit weekly   Past Week    Omega-3 Fatty Acids (FISH OIL) 1200 MG Oral Cap Take by mouth in the morning.   7/7/2025    omeprazole 20 MG Oral Capsule Delayed Release Take 1 capsule (20 mg total) by mouth every morning before breakfast. Takes 2 capsules every morning   7/7/2025    SIMVASTATIN 10 MG Oral Tab TAKE 1 TABLET(10 MG) BY MOUTH EVERY NIGHT 90 tablet 1 7/7/2025    traMADol 50 MG Oral Tab Take 1-2 tablets ( mg total) by mouth every 6 (six) hours as needed for Pain. (Patient not taking: Reported on 6/30/2025) 10 tablet 0 Not Taking    SITagliptin Phosphate 50 MG Oral Tab Take 0.5 tablets (25 mg total) by mouth in the morning.   More than a month   [3]   Past Surgical History:  Procedure Laterality Date    Colonoscopy N/A 6/7/2024    Procedure: COLONOSCOPY;  Surgeon: Jesus Pollard DO;  Location:  ENDOSCOPY   [4]   Social History  Socioeconomic History    Marital status:    Tobacco Use    Smoking status: Never    Smokeless tobacco: Never   Vaping Use    Vaping status: Never Used   Substance and Sexual Activity    Alcohol use: Yes     Comment: Social/shot of whiskey sometimes before bed    Drug use: Never    Sexual activity: Not Currently

## 2025-07-08 NOTE — H&P
[facility]   part of [facility]    Blas Griffin Patient Status:  Hospital Outpatient Surgery    1955 MRN [id number]   Location [facility] ENDOSCOPY PAIN [facility]ENTER Attending Jesus Pollard DO   Hosp Day # 0 PCP Zoraida Leal MD     Active Problems:    * No active hospital problems. *      Blas Griffin is a 70 year old male.    Allergies: Allergies[1]    Past Medical History[2]    Blood pressure 135/63, pulse 70, temperature 98.4 °F (36.9 °C), temperature source Temporal, resp. rate 17, height 5' 10\" (1.778 m), weight 165 lb (74.8 kg), SpO2 99%.    GENERAL HEALTH: feels well otherwise  SKIN: denies any unusual skin lesions or rashes  RESPIRATORY: denies shortness of breath with exertion  CARDIOVASCULAR: denies chest pain on exertion  GI:Refer to HPI   : no dysuria, hematuria  MUSCULOSKELETAL: no joint pain, swelling, or warmth  NEURO: denies headaches     Objective:     Ht 6' (1.829 m)   Wt 216 lb 6.4 oz (98.2 kg)   BMI 29.35 kg/m²    GENERAL: well developed, well nourished, in no apparent distress  SKIN: no rashes, no jaundice  HEENT: atraumatic, normocephalic  EYES: no icterus  NECK: supple  LUNGS: normal respiratory motion without distress  CARDIO: RRR without murmur  GI: normal active BS, no tenderness on palpation, no masses or ascites, normal liver span/no organomegaly  MUSCULOSKELETAL: no joint deformity, swelling or erythema  NEURO: Alert   EXTREMITIES: no edema        Colonoscopy        Jesus Pollard DO  2025       [1] No Known Allergies  [2]   Past Medical History:   Cancer (HCC)    Colon    Diabetes (HCC)    Esophageal reflux    High blood pressure    High cholesterol    Personal history of antineoplastic chemotherapy    LAST CHEMO 2025    Viral upper respiratory tract infection    465.9 2018 Viral upper respiratory tract infection Active [id number]    Visual impairment    glasses

## 2025-07-08 NOTE — OPERATIVE REPORT
Colonoscopy Operative Report    Blas Griffin Patient Status:  Hospital Outpatient Surgery    1955 MRN [id number]   [facility] ENDOSCOPY PAIN CENTER Attending Jesus Pollard,    Hosp Day #   0 PCP Zoraida Leal MD     Pre-Operative Diagnosis: MALIGNANT NEOPLASM OF COLON, UNSPECIFIED PART OF COLON    Post-Operative Diagnosis:    Once again we witnessed sigmoid colon mass (found of colonoscopy in 2024) which has significantly shrunken and decreased in size but the mass is still completely obstructing and we could not traversed with colonoscope. This was present at 30 cm from anal verge   Evidence of previously injected tejas ink in sigmoid colon       Procedure Performed: COLONOSCOPY     Informed Consent: Informed consent for both the procedure and sedation were obtained from the patient. The potentially life-threatening complications of sedation, bleeding,  perforation, transfusion or repeat endoscopy  were reviewed along with the possible need for hospitalization, surgical management, transfusion or repeat endoscopy should one of these complications arise. The patient understands and is agreeable to proceed.  Sedation Type: MAC-Patient received sedation with monitored anesthesia provided by an anesthesiologist  Moderate Sedation Time:   A trained sedation nurse was present to assist in monitoring the patient during the entire length of moderate sedation time.    Cecum Withdrawal Time:  NA  Date of previous colonoscopy: 2024     Procedure Description: The patient was placed in the left lateral decubitus position.  After careful digital rectal examination, the Adult colonoscope was inserted into the rectum and advanced to the level of the sigmoid colon. . The scope was withdrawn to the rectum and retroflexion was performed.  The patient tolerated the procedure well with no immediate complications. The patient was transferred  to the recovery area in stable condition.  Quality of Preparation: Adequate  Aronchick Bowel Prep Scale:  1- excellent   Findings:   Once again we witnessed sigmoid colon mass (found of colonoscopy in June 2024) which has significantly shrunken and decreased in size but the mass is still completely obstructing and we could not traversed with colonoscope beyond this sigmoid colon  mass.  Biopsied. This mass is  present at 30 cm from anal verge   Evidence of previously injected tejas ink in sigmoid colon     Recommendations:   Follow-up with oncology and surgery   Discharge:  The patient was given an after visit summary detailing the procedure, findings, recommendations and follow up plans.     Jesus Pollard DO  7/8/2025  9:16 AM

## 2025-07-08 NOTE — ANESTHESIA POSTPROCEDURE EVALUATION
[facility]    Blas Griffin Patient Status:  Hospital Outpatient Surgery   Age/Gender 70 year old male MRN [id number]   Location [facility] ENDOSCOPY PAIN CENTER Attending Jesus Pollard DO   Hosp Day # 0 PCP Zoraida Leal MD       Anesthesia Post-op Note    COLONOSCOPY with biopsies    Procedure Summary       Date: 07/08/25 Room / Location:  ENDOSCOPY 03 / [facility] ENDOSCOPY    Anesthesia Start: 0905 Anesthesia Stop: 0918    Procedure: COLONOSCOPY with biopsies Diagnosis: (colon mass)    Surgeons: Jesus Pollard DO Anesthesiologist: Shonda Thomas MD    Anesthesia Type: MAC ASA Status: 3            Anesthesia Type: MAC    Vitals Value Taken Time   /60 07/08/25 09:29   Temp  07/08/25 09:30   Pulse 76 07/08/25 09:30   Resp 16 07/08/25 09:17   SpO2 100 % 07/08/25 09:30   Vitals shown include unfiled device data.        Patient Location: Endoscopy    Anesthesia Type: MAC    Airway Patency: patent    Postop Pain Control: adequate    Mental Status: preanesthetic baseline    Nausea/Vomiting: none    Cardiopulmonary/Hydration status: stable euvolemic    Complications: no apparent anesthesia related complications    Postop vital signs: stable    Dental Exam: Unchanged from Preop    Patient to be discharged from PACU when criteria met.

## (undated) DEVICE — VALVE AIR/H20 DEFENDO SCT BX

## (undated) DEVICE — APPLICATOR PREP 26ML CHG 2% ISO ALC 70%

## (undated) DEVICE — KIT CUSTOM ENDOPROCEDURE STERIS

## (undated) DEVICE — MARKER ENDOSCP TISS TATTOO C BLK SUSP PREFIL

## (undated) DEVICE — ANTIBACTERIAL UNDYED BRAIDED (POLYGLACTIN 910), SYNTHETIC ABSORBABLE SUTURE: Brand: COATED VICRYL

## (undated) DEVICE — 1 ML TUBERCULIN SYRINGE REGULAR TIP: Brand: MONOJECT

## (undated) DEVICE — V2 SPECIMEN COLLECTION MANIFOLD KIT: Brand: NEPTUNE

## (undated) DEVICE — 1200CC GUARDIAN II: Brand: GUARDIAN

## (undated) DEVICE — 10FT COMBINED O2 DELIVERY/CO2 MONITORING. FILTER WITH MICROSTREAM TYPE LUER: Brand: DUAL ADULT NASAL CANNULA

## (undated) DEVICE — 3M™ RED DOT™ MONITORING ELECTRODE WITH FOAM TAPE AND STICKY GEL 2570-3, 3/BAG, 200/CASE, 54/PLT: Brand: RED DOT™

## (undated) DEVICE — GLOVE SUR 8 SENSICARE PI PIP CRM PWD F

## (undated) DEVICE — SUT COAT VCRL + 3-0 18IN ABSRB UD ANTIBACT

## (undated) DEVICE — SOLUTION IRRIG 1000ML 0.9% NACL USP BTL

## (undated) DEVICE — SLEEVE COMPR MD KNEE LEN SGL USE KENDALL SCD

## (undated) DEVICE — C-ARM: Brand: UNBRANDED

## (undated) DEVICE — COVER LT HNDL RIG FOR SUR CAM DISP

## (undated) DEVICE — #11 STERILE BLADE: Brand: POLYMER COATED BLADES

## (undated) DEVICE — GLOVE SUR 7.5 SENSICARE PI PIP CRM PWD F

## (undated) DEVICE — SUT PROL 2-0 30IN SH NABSRB BLU L26MM 1/2 CIR

## (undated) DEVICE — NEEDLE INJ 25GA CATH 230CM CHN 2.8MM ACUJECT

## (undated) DEVICE — GIJAW SINGLE-USE BIOPSY FORCEPS WITH NEEDLE: Brand: GIJAW

## (undated) DEVICE — BITEBLOCK ENDOSCP 60FR MAXI STRP

## (undated) DEVICE — BREAST-HERNIA-PORT CDS-LF: Brand: MEDLINE INDUSTRIES, INC.

## (undated) DEVICE — ADHESIVE SKIN TOP FOR WND CLSR DERMBND ADV

## (undated) DEVICE — UNDYED BRAIDED (POLYGLACTIN 910), SYNTHETIC ABSORBABLE SUTURE: Brand: COATED VICRYL

## (undated) NOTE — LETTER
OUTSIDE TESTING RESULT REQUEST     IMPORTANT: FOR YOUR IMMEDIATE ATTENTION  Please FAX all test results listed below to: [phone]         * * * * Please arrange with patient A.S.A.P. * * * *      Patient Name: Blas Griffin  Surgery Date: 2024  Medical Record: [id number]  CSN: [id number]  : 1955 - A: 69 y     Sex: male  Surgeon(s):  William Weaver MD  Procedure: PORT-A-CATHETER PLACEMENT WITH FLUOROSCOPY  Anesthesia Type: MAC     Surgeon: William Weaver MD     The following Testing and Time Line are REQUIRED PER ANESTHESIA       Pt needs pre op EKG for above procedure. He would like to do thru you.      Thank You,   Sent by:Elham Keene